# Patient Record
Sex: FEMALE | Race: BLACK OR AFRICAN AMERICAN | NOT HISPANIC OR LATINO | Employment: FULL TIME | URBAN - METROPOLITAN AREA
[De-identification: names, ages, dates, MRNs, and addresses within clinical notes are randomized per-mention and may not be internally consistent; named-entity substitution may affect disease eponyms.]

---

## 2023-04-25 ENCOUNTER — OFFICE VISIT (OUTPATIENT)
Dept: PRIMARY CARE | Facility: CLINIC | Age: 18
End: 2023-04-25
Payer: MEDICAID

## 2023-04-25 VITALS
HEART RATE: 72 BPM | WEIGHT: 124.5 LBS | OXYGEN SATURATION: 96 % | SYSTOLIC BLOOD PRESSURE: 129 MMHG | DIASTOLIC BLOOD PRESSURE: 74 MMHG | TEMPERATURE: 98.6 F

## 2023-04-25 DIAGNOSIS — R00.0 TACHYCARDIA: ICD-10-CM

## 2023-04-25 DIAGNOSIS — Z30.09 BIRTH CONTROL COUNSELING: ICD-10-CM

## 2023-04-25 DIAGNOSIS — M25.531 RIGHT WRIST PAIN: ICD-10-CM

## 2023-04-25 DIAGNOSIS — Z76.89 ENCOUNTER TO ESTABLISH CARE: Primary | ICD-10-CM

## 2023-04-25 DIAGNOSIS — Z71.85 VACCINE COUNSELING: ICD-10-CM

## 2023-04-25 PROCEDURE — 1036F TOBACCO NON-USER: CPT | Performed by: STUDENT IN AN ORGANIZED HEALTH CARE EDUCATION/TRAINING PROGRAM

## 2023-04-25 PROCEDURE — 99204 OFFICE O/P NEW MOD 45 MIN: CPT | Performed by: STUDENT IN AN ORGANIZED HEALTH CARE EDUCATION/TRAINING PROGRAM

## 2023-04-25 RX ORDER — MELOXICAM 15 MG/1
15 TABLET ORAL DAILY
Qty: 30 TABLET | Refills: 0 | Status: SHIPPED | OUTPATIENT
Start: 2023-04-25 | End: 2024-04-24

## 2023-04-25 ASSESSMENT — PAIN SCALES - GENERAL: PAINLEVEL: 4

## 2024-09-19 NOTE — PROGRESS NOTES
04/25/23    Subjective   Patient ID: Tammy Ledezma is a 17 y.o. female who presents for Establish Care (Needs medical exemption paperwork filled out. //Wants to know if she should keep her belly button piercing.).    HPI    Establish care  - History: UTD  - Meds: none  - Allergies: NKDA  - FHx: mother (a fib, liver disease, some thyroid disease)    Concerns:     Wrist pain  - left sided  - going on for about 5-6 months  - started after she started pole dancing  - certain motions hurt her more      Objective   /74 (BP Location: Right arm, Patient Position: Sitting, BP Cuff Size: Adult)   Pulse 72   Temp 37 °C (98.6 °F) (Temporal)   Wt 56.5 kg (124 lb 8 oz)   LMP 04/20/2023 (Exact Date)   SpO2 96%     Physical Exam    General: well-appearing, NAD  HEENT: NC/AT  Cardiac: rrr, no m/r/g  Lungs: normal work of breathing, CTAB  MSK: left wrist without obvious deformity; full active and passive ROM; mild tenderness to palpation over ulnar aspect of wrist, otherwise non-tender to palpation  Neuro: grossly intact  Psych: no depression, anxiety      Assessment/Plan   Problem List Items Addressed This Visit    None  Visit Diagnoses       Encounter to establish care    -  Primary    Tachycardia        Right wrist pain        Relevant Medications    meloxicam (Mobic) 15 mg tablet    Vaccine counseling        Birth control counseling              Establish care  - history and meds reviewed    Tachycardia  - HR was 111 initially on vitals, but on re-check was in the 70s  - patient with no history of tachycardia, no further evaluation at this time  - monitor at home, if watch tells her she goes tachycardic (>100 bpm) then RTC and will need to work up more    Left Wrist Pain  - chronic, not at goal  - limiting patient's ability to work at times  - likely ulnar collateral ligament injury  - discussed NSAIDs, sent rx to pharmacy, told to take daily for 2 weeks, then prn  - also talked about activity modification, likely  pain is from overuse, patient will start to decrease work load    Vaccine Counseling  - spent significant time providing vaccine counseling for COVID vaccine  - discussed that all vaccines have side effects and risks associated with them, but the risks are very low  - told the patient that I did not think a medical exemption was appropriate in her case, as sickle cell trait is not a contraindication to the vaccine  - after further discussion of side effects and expectations, patient willing to go to pharmacy to get COVID vaccine    Birth Control Counseling  - discussed with patient that is she is not on birth control and not using protection during sex, then she is likely to get pregnant in the future  - patient states she is definitely not interested in getting pregnant at this time, and probably ever  - talked about the different methods of birth control, and the pros and cons of each one as well as the potential side effects  - patient seems interested, but will defer at this time    RTC in 1 month for continue discussion on birth control, will need STI testing as well    Approximately 50 minutes was spent on the day of this encounter, including time spent face-to-face with the patient, as well as time spent doing chart review, documentation, counseling, care coordination, and placing orders.      Dion Bernard MD   normal/no clubbing/no cyanosis